# Patient Record
(demographics unavailable — no encounter records)

---

## 2024-10-25 NOTE — PHYSICAL EXAM
[General Appearance - Alert] : alert [General Appearance - In No Acute Distress] : in no acute distress [General Appearance - Well Nourished] : well nourished [General Appearance - Well-Appearing] : healthy appearing [Sclera] : the sclera and conjunctiva were normal [PERRL With Normal Accommodation] : pupils were equal in size, round, reactive to light [Extraocular Movements] : extraocular movements were intact [Outer Ear] : the ears and nose were normal in appearance [Hearing Threshold Finger Rub Not [place]] : hearing was normal [Examination Of The Oral Cavity] : the lips and gums were normal [Both Tympanic Membranes Were Examined] : both tympanic membranes were normal [Oropharynx] : the oropharynx was normal with no thrush [Neck Appearance] : the appearance of the neck was normal [Neck Cervical Mass (___cm)] : no neck mass was observed [Jugular Venous Distention Increased] : there was no jugular-venous distention [Thyroid Diffuse Enlargement] : the thyroid was not enlarged [Respiration, Rhythm And Depth] : normal respiratory rhythm and effort [Exaggerated Use Of Accessory Muscles For Inspiration] : no accessory muscle use [Auscultation Breath Sounds / Voice Sounds] : lungs were clear to auscultation bilaterally [Heart Rate And Rhythm] : heart rate was normal and rhythm regular [Heart Sounds] : normal S1 and S2 [Heart Sounds Gallop] : no gallops [Murmurs] : no murmurs [Heart Sounds Pericardial Friction Rub] : no pericardial rub [Edema] : there was no peripheral edema [Bowel Sounds] : normal bowel sounds [Abdomen Soft] : soft [Abdomen Tenderness] : non-tender [Costovertebral Angle Tenderness] : no CVA tenderness [No Palpable Adenopathy] : no palpable adenopathy [Musculoskeletal - Swelling] : no joint swelling [Nail Clubbing] : no clubbing  or cyanosis of the fingernails [Motor Tone] : muscle strength and tone were normal [Skin Color & Pigmentation] : normal skin color and pigmentation [] : no rash [Skin Lesions] : no skin lesions [Cranial Nerves] : cranial nerves 2-12 were intact [Sensation] : the sensory exam was normal to light touch and pinprick [Motor Exam] : the motor exam was normal [No Focal Deficits] : no focal deficits [Oriented To Time, Place, And Person] : oriented to person, place, and time [Affect] : the affect was normal [FreeTextEntry1] : very anxious during consult

## 2024-10-25 NOTE — HISTORY OF PRESENT ILLNESS
[FreeTextEntry1] : 25 yo male here for PEP/ PrEP initial consult  on 10/15/2024 pt noted a drop of blood on sink when he was washing his hands in the ER he was in the ER for chemical exposure at work  pt states he had a small break in skin on cuticle area  was afraid of elisa HIV  despite being told by ER provider the risk is extremely low for HIV transmission, pt opted to start PEP  was given Truvada but not Isentress.  He started the Truvada that day.  he called the ER due to the Truvada was exposed to extreme heat - asking for new bottle of Truvada  was told that he needed the Isentress with the Truvada for a complete PEP regimen He was started on Isentress 5 days after exposure  no SE with Truvada  noted with upset stomach and nausea Isentress for the past 3 days.   PMH : Fatty liver, asthma, anxiety, depression - under treatment, schizophrenia, tinnitus bilat, essential tremors, CTS bilat,  denies any recent hospitalizations, blood transfusions PCP : Dr. Phoebe Cooley.   Screenings : none  Vaccines : COVID Pfizer x2.  declines Influenza vaccine  PSH : appendectomy, PFH : Pat Aunts - Pulmonary CA,  Pat Aunts- cerebral aunts, MGF- Alzheimer's dx  Social hx : denies any IVDU, confirms marijuana use, former cigarette smoker- last 2016. last ETOH use 2023.  confirms tattoos  Sexual hx : Heterosexual. last sexual encounter 2019.  denies any condom use.  denies any hx of STI Partner : possible partner in another country- plans on visiting her 12/2024. 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   NKDA     10/24/2024 Plan  Exposure to blood Reviewed blood work results done in ER.  counselled patient that risk of HIV transmission from dried blood on sink in ER is negligible, amelia with intact skin. Counselled on Hepatitis C transmission- as can live on surfaces for weeks - however pt with intact skin during contact.  Pt was started on Truvada immediately but started Isentress 5 days after exposure.  Counselled OK to stop PEP medication as both medications needs to start withing 72 hours of exposure and risk is negligible.  Pt verbalized understanding.  1. stop PEP treatment  2. will repeat blood work in one month   PrEP  pt states intermittent blood exposure with work.  Counselled on PrEP commencement to prevent HIV transmission. Counselled on Truvada vs Descovy - dosing, risks, benefits, SE, long term effects, monitoring, scheduling.  Pt admitted will be in contact with girlfriend in [facility] 12/2024 and plans to be sexually active.  1. start Descovy one tab daily  2. completed STI testing 3. f/u one week via telemed for results discussion.    Anxiety/ depression.  pt noted with high level anxiety during consult.  Pt states has elevated glucose and Isentress caused the glucose to become more elevated, amelia also after eating.  Pt doing home fingersticks for glucose monitoring.  On review of labs in [facility], glucose levels are unremarkable and HgA1C is <5.0.  denies being dx with DM or taking any medications for DM.  Counselled patient that he does NOT have DM or even pre-DM.  Counselled it is a normal physiological reaction for glucose to become transiently elevated after meals, amelia a high sugar meal or drink.  The body will self-regulate- as indicated in blood work.  Explained regarding pts living with DM cannot self-regulate and need medication assistance.  That is not the case with patient.  Counselled to stop home glucose monitoring as is unnecessary.  Causing more mental stress that is not necessary.  Pt also scored depression screen- pt with hx of depression symptoms.  Pt under psychologist care and pending psychiatry consult for commencement of treatment.   1. f/u with psychologist as scheduled and consult with psychiatry for possible medication management.

## 2024-10-25 NOTE — ASSESSMENT
[FreeTextEntry1] : PEP/ PrEP initial consult   10/24/2024 Plan  Exposure to blood Reviewed blood work results done in ER.  counselled patient that risk of HIV transmission from dried blood on sink in ER is negligible, amelia with intact skin. Counselled on Hepatitis C transmission- as can live on surfaces for weeks - however pt with intact skin during contact.  Pt was started on Truvada immediately but started Isentress 5 days after exposure.  Counselled OK to stop PEP medication as both medications needs to start withing 72 hours of exposure and risk is negligible.  Pt verbalized understanding.  1. stop PEP treatment  2. will repeat blood work in one month   PrEP  pt states intermittent blood exposure with work.  Counselled on PrEP commencement to prevent HIV transmission. Counselled on Truvada vs Descovy - dosing, risks, benefits, SE, long term effects, monitoring, scheduling.  Pt admitted will be in contact with girlfriend in [facility] 12/2024 and plans to be sexually active.  1. start Descovy one tab daily  2. completed STI testing 3. f/u one week via telemed for results discussion.    Anxiety/ depression.  pt noted with high level anxiety during consult.  Pt states has elevated glucose and Isentress caused the glucose to become more elevated, amelia also after eating.  Pt doing home fingersticks for glucose monitoring.  On review of labs in [facility], glucose levels are unremarkable and HgA1C is <5.0.  denies being dx with DM or taking any medications for DM.  Counselled patient that he does NOT have DM or even pre-DM.  Counselled it is a normal physiological reaction for glucose to become transiently elevated after meals, amelia a high sugar meal or drink.  The body will self-regulate- as indicated in blood work.  Explained regarding pts living with DM cannot self-regulate and need medication assistance.  That is not the case with patient.  Counselled to stop home glucose monitoring as is unnecessary.  Causing more mental stress that is not necessary.  Pt also scored depression screen- pt with hx of depression symptoms.  Pt under psychologist care and pending psychiatry consult for commencement of treatment.   1. f/u with psychologist as scheduled and consult with psychiatry for possible medication management.       [Treatment Education] : treatment education [Treatment Adherence] : treatment adherence [Rx Dose / Side Effects] : Rx dose/side effects [Medical Care Issues] : medical care issues [Drug Interactions / Side Effects] : drug interactions/side effects [HIV Education] : HIV Education [Sexuality / Safer Sex] : sexuality/safer sex [Education Materials Provided] : Eduction materials were provided

## 2024-10-25 NOTE — HISTORY OF PRESENT ILLNESS
[FreeTextEntry1] : 25 yo male here for PEP/ PrEP initial consult  on 10/15/2024 pt noted a drop of blood on sink when he was washing his hands in the ER he was in the ER for chemical exposure at work  pt states he had a small break in skin on cuticle area  was afraid of elisa HIV  despite being told by ER provider the risk is extremely low for HIV transmission, pt opted to start PEP  was given Truvada but not Isentress.  He started the Truvada that day.  he called the ER due to the Truvada was exposed to extreme heat - asking for new bottle of Truvada  was told that he needed the Isentress with the Truvada for a complete PEP regimen He was started on Isentress 5 days after exposure  no SE with Truvada  noted with upset stomach and nausea Isentress for the past 3 days.   PMH : Fatty liver, asthma, anxiety, depression - under treatment, schizophrenia, tinnitus bilat, essential tremors, CTS bilat,  denies any recent hospitalizations, blood transfusions PCP : Dr. Phoebe Cooley.   Screenings : none  Vaccines : COVID Pfizer x2.  declines Influenza vaccine  PSH : appendectomy, PFH : Pat Aunts - Pulmonary CA,  Pat Aunts- cerebral aunts, MGF- Alzheimer's dx  Social hx : denies any IVDU, confirms marijuana use, former cigarette smoker- last 2016. last ETOH use 2023.  confirms tattoos  Sexual hx : Heterosexual. last sexual encounter 2019.  denies any condom use.  denies any hx of STI Partner : possible partner in another country- plans on visiting her 12/2024. 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   NKDA     10/24/2024 Plan  Exposure to blood Reviewed blood work results done in ER.  counselled patient that risk of HIV transmission from dried blood on sink in ER is negligible, amelia with intact skin. Counselled on Hepatitis C transmission- as can live on surfaces for weeks - however pt with intact skin during contact.  Pt was started on Truvada immediately but started Isentress 5 days after exposure.  Counselled OK to stop PEP medication as both medications needs to start withing 72 hours of exposure and risk is negligible.  Pt verbalized understanding.  1. stop PEP treatment  2. will repeat blood work in one month   PrEP  pt states intermittent blood exposure with work.  Counselled on PrEP commencement to prevent HIV transmission. Counselled on Truvada vs Descovy - dosing, risks, benefits, SE, long term effects, monitoring, scheduling.  Pt admitted will be in contact with girlfriend in [facility] 12/2024 and plans to be sexually active.  1. start Descovy one tab daily  2. completed STI testing 3. f/u one week via telemed for results discussion.    Anxiety/ depression.  pt noted with high level anxiety during consult.  Pt states has elevated glucose and Isentress caused the glucose to become more elevated, amelia also after eating.  Pt doing home fingersticks for glucose monitoring.  On review of labs in [facility], glucose levels are unremarkable and HgA1C is <5.0.  denies being dx with DM or taking any medications for DM.  Counselled patient that he does NOT have DM or even pre-DM.  Counselled it is a normal physiological reaction for glucose to become transiently elevated after meals, amelia a high sugar meal or drink.  The body will self-regulate- as indicated in blood work.  Explained regarding pts living with DM cannot self-regulate and need medication assistance.  That is not the case with patient.  Counselled to stop home glucose monitoring as is unnecessary.  Causing more mental stress that is not necessary.  Pt also scored depression screen- pt with hx of depression symptoms.  Pt under psychologist care and pending psychiatry consult for commencement of treatment.   1. f/u with psychologist as scheduled and consult with psychiatry for possible medication management.

## 2024-10-25 NOTE — PHYSICAL EXAM
[General Appearance - Alert] : alert [General Appearance - In No Acute Distress] : in no acute distress [General Appearance - Well Nourished] : well nourished [General Appearance - Well-Appearing] : healthy appearing [Sclera] : the sclera and conjunctiva were normal [PERRL With Normal Accommodation] : pupils were equal in size, round, reactive to light [Extraocular Movements] : extraocular movements were intact [Outer Ear] : the ears and nose were normal in appearance [Hearing Threshold Finger Rub Not [place]] : hearing was normal [Examination Of The Oral Cavity] : the lips and gums were normal [Both Tympanic Membranes Were Examined] : both tympanic membranes were normal [Oropharynx] : the oropharynx was normal with no thrush [Neck Appearance] : the appearance of the neck was normal [Neck Cervical Mass (___cm)] : no neck mass was observed [Jugular Venous Distention Increased] : there was no jugular-venous distention [Thyroid Diffuse Enlargement] : the thyroid was not enlarged [Respiration, Rhythm And Depth] : normal respiratory rhythm and effort [Exaggerated Use Of Accessory Muscles For Inspiration] : no accessory muscle use [Auscultation Breath Sounds / Voice Sounds] : lungs were clear to auscultation bilaterally [Heart Rate And Rhythm] : heart rate was normal and rhythm regular [Heart Sounds] : normal S1 and S2 [Heart Sounds Gallop] : no gallops [Murmurs] : no murmurs [Heart Sounds Pericardial Friction Rub] : no pericardial rub [Edema] : there was no peripheral edema [Bowel Sounds] : normal bowel sounds [Abdomen Soft] : soft [Abdomen Tenderness] : non-tender [Costovertebral Angle Tenderness] : no CVA tenderness [No Palpable Adenopathy] : no palpable adenopathy [Musculoskeletal - Swelling] : no joint swelling [Nail Clubbing] : no clubbing  or cyanosis of the fingernails [Motor Tone] : muscle strength and tone were normal [Skin Color & Pigmentation] : normal skin color and pigmentation [] : no rash [Skin Lesions] : no skin lesions [Cranial Nerves] : cranial nerves 2-12 were intact [Sensation] : the sensory exam was normal to light touch and pinprick [Motor Exam] : the motor exam was normal [No Focal Deficits] : no focal deficits [Oriented To Time, Place, And Person] : oriented to person, place, and time [Affect] : the affect was normal [FreeTextEntry1] : very anxious during consult

## 2024-10-25 NOTE — ASSESSMENT
[FreeTextEntry1] : PEP/ PrEP initial consult   10/24/2024 Plan  Exposure to blood Reviewed blood work results done in ER.  counselled patient that risk of HIV transmission from dried blood on sink in ER is negligible, amelia with intact skin. Counselled on Hepatitis C transmission- as can live on surfaces for weeks - however pt with intact skin during contact.  Pt was started on Truvada immediately but started Isentress 5 days after exposure.  Counselled OK to stop PEP medication as both medications needs to start withing 72 hours of exposure and risk is negligible.  Pt verbalized understanding.  1. stop PEP treatment  2. will repeat blood work in one month   PrEP  pt states intermittent blood exposure with work.  Counselled on PrEP commencement to prevent HIV transmission. Counselled on Truvada vs Descovy - dosing, risks, benefits, SE, long term effects, monitoring, scheduling.  Pt admitted will be in contact with girlfriend in [facility] 12/2024 and plans to be sexually active.  1. start Descovy one tab daily  2. completed STI testing 3. f/u one week via telemed for results discussion.    Anxiety/ depression.  pt noted with high level anxiety during consult.  Pt states has elevated glucose and Isentress caused the glucose to become more elevated, amelia also after eating.  Pt doing home fingersticks for glucose monitoring.  On review of labs in [facility], glucose levels are unremarkable and HgA1C is <5.0.  denies being dx with DM or taking any medications for DM.  Counselled patient that he does NOT have DM or even pre-DM.  Counselled it is a normal physiological reaction for glucose to become transiently elevated after meals, amelia a high sugar meal or drink.  The body will self-regulate- as indicated in blood work.  Explained regarding pts living with DM cannot self-regulate and need medication assistance.  That is not the case with patient.  Counselled to stop home glucose monitoring as is unnecessary.  Causing more mental stress that is not necessary.  Pt also scored depression screen- pt with hx of depression symptoms.  Pt under psychologist care and pending psychiatry consult for commencement of treatment.   1. f/u with psychologist as scheduled and consult with psychiatry for possible medication management.       [Treatment Education] : treatment education [Treatment Adherence] : treatment adherence [Rx Dose / Side Effects] : Rx dose/side effects [Medical Care Issues] : medical care issues [Drug Interactions / Side Effects] : drug interactions/side effects [HIV Education] : HIV Education [Sexuality / Safer Sex] : sexuality/safer sex [Education Materials Provided] : Eduction materials were provided

## 2024-11-27 NOTE — HISTORY OF PRESENT ILLNESS
[FreeTextEntry1] : 26 yo male here for PrEP f/u consult and results dicussion taking Descovy daily with no missed doses or SE did have nausea the first few weeks and then resolved.  denies any c/o at this time    From previous consult 10/24/2024 :  25 yo male here for PEP/ PrEP initial consult on 10/15/2024 pt noted a drop of blood on sink when he was washing his hands in the ER he was in the ER for chemical exposure at work pt states he had a small break in skin on cuticle area was afraid of elisa HIV despite being told by ER provider the risk is extremely low for HIV transmission, pt opted to start PEP was given Truvada but not Isentress. He started the Truvada that day. he called the ER due to the Truvada was exposed to extreme heat - asking for new bottle of Truvada was told that he needed the Isentress with the Truvada for a complete PEP regimen He was started on Isentress 5 days after exposure no SE with Truvada noted with upset stomach and nausea Isentress for the past 3 days.  PMH : Fatty liver, asthma, anxiety, depression - under treatment, schizophrenia, tinnitus bilat, essential tremors, CTS bilat, denies any recent hospitalizations, blood transfusions PCP : Dr. Phoebe Cooley. Screenings : none Vaccines : COVID Pfizer x2. declines Influenza vaccine PSH : appendectomy, PFH : Pat Aunts - Pulmonary CA, Pat Aunts- cerebral aunts, MGF- Alzheimer's dx Social hx : denies any IVDU, confirms marijuana use, former cigarette smoker- last 2016. last ETOH use 2023. confirms tattoos Sexual hx : Heterosexual. last sexual encounter 2019. denies any condom use. denies any hx of STI Partner : possible partner in another country- plans on visiting her 12/2024. 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  NKDA    10/24/2024 Plan PrEP all test results from previous consult reviewed with patient.   1. continue current treatment  2. repeat HIV testing as per pt request 3. f/u 3 months

## 2024-11-27 NOTE — HISTORY OF PRESENT ILLNESS
[FreeTextEntry1] : 24 yo male here for PrEP f/u consult and results dicussion taking Descovy daily with no missed doses or SE did have nausea the first few weeks and then resolved.  denies any c/o at this time    From previous consult 10/24/2024 :  23 yo male here for PEP/ PrEP initial consult on 10/15/2024 pt noted a drop of blood on sink when he was washing his hands in the ER he was in the ER for chemical exposure at work pt states he had a small break in skin on cuticle area was afraid of elisa HIV despite being told by ER provider the risk is extremely low for HIV transmission, pt opted to start PEP was given Truvada but not Isentress. He started the Truvada that day. he called the ER due to the Truvada was exposed to extreme heat - asking for new bottle of Truvada was told that he needed the Isentress with the Truvada for a complete PEP regimen He was started on Isentress 5 days after exposure no SE with Truvada noted with upset stomach and nausea Isentress for the past 3 days.  PMH : Fatty liver, asthma, anxiety, depression - under treatment, schizophrenia, tinnitus bilat, essential tremors, CTS bilat, denies any recent hospitalizations, blood transfusions PCP : Dr. Phoebe Cooley. Screenings : none Vaccines : COVID Pfizer x2. declines Influenza vaccine PSH : appendectomy, PFH : Pat Aunts - Pulmonary CA, Pat Aunts- cerebral aunts, MGF- Alzheimer's dx Social hx : denies any IVDU, confirms marijuana use, former cigarette smoker- last 2016. last ETOH use 2023. confirms tattoos Sexual hx : Heterosexual. last sexual encounter 2019. denies any condom use. denies any hx of STI Partner : possible partner in another country- plans on visiting her 12/2024. 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  NKDA    10/24/2024 Plan PrEP all test results from previous consult reviewed with patient.   1. continue current treatment  2. repeat HIV testing as per pt request 3. f/u 3 months

## 2024-11-27 NOTE — PHYSICAL EXAM
[General Appearance - Alert] : alert [General Appearance - In No Acute Distress] : in no acute distress [General Appearance - Well Nourished] : well nourished [General Appearance - Well-Appearing] : healthy appearing [Sclera] : the sclera and conjunctiva were normal [PERRL With Normal Accommodation] : pupils were equal in size, round, reactive to light [Extraocular Movements] : extraocular movements were intact [Outer Ear] : the ears and nose were normal in appearance [Hearing Threshold Finger Rub Not [place]] : hearing was normal [Examination Of The Oral Cavity] : the lips and gums were normal [Both Tympanic Membranes Were Examined] : both tympanic membranes were normal [Oropharynx] : the oropharynx was normal with no thrush [Neck Appearance] : the appearance of the neck was normal [Neck Cervical Mass (___cm)] : no neck mass was observed [Jugular Venous Distention Increased] : there was no jugular-venous distention [Thyroid Diffuse Enlargement] : the thyroid was not enlarged [Respiration, Rhythm And Depth] : normal respiratory rhythm and effort [Exaggerated Use Of Accessory Muscles For Inspiration] : no accessory muscle use [Auscultation Breath Sounds / Voice Sounds] : lungs were clear to auscultation bilaterally [Heart Rate And Rhythm] : heart rate was normal and rhythm regular [Heart Sounds] : normal S1 and S2 [Heart Sounds Gallop] : no gallops [Murmurs] : no murmurs [Heart Sounds Pericardial Friction Rub] : no pericardial rub [Edema] : there was no peripheral edema [Bowel Sounds] : normal bowel sounds [Abdomen Soft] : soft [Abdomen Tenderness] : non-tender [Costovertebral Angle Tenderness] : no CVA tenderness [No Palpable Adenopathy] : no palpable adenopathy [Musculoskeletal - Swelling] : no joint swelling [Nail Clubbing] : no clubbing  or cyanosis of the fingernails [Motor Tone] : muscle strength and tone were normal [Skin Color & Pigmentation] : normal skin color and pigmentation [] : no rash [Skin Lesions] : no skin lesions [Cranial Nerves] : cranial nerves 2-12 were intact [Sensation] : the sensory exam was normal to light touch and pinprick [Motor Exam] : the motor exam was normal [No Focal Deficits] : no focal deficits [Oriented To Time, Place, And Person] : oriented to person, place, and time [Affect] : the affect was normal [FreeTextEntry1] : anxious during consult

## 2024-11-27 NOTE — ASSESSMENT
[FreeTextEntry1] : PrEP f/u consult   10/24/2024 Plan PrEP all test results from previous consult reviewed with patient.   1. continue current treatment  2. repeat HIV testing as per pt request 3. f/u 3 months   [Treatment Education] : treatment education [Treatment Adherence] : treatment adherence

## 2024-11-27 NOTE — PHYSICAL EXAM
[General Appearance - Alert] : alert [General Appearance - In No Acute Distress] : in no acute distress [General Appearance - Well Nourished] : well nourished [General Appearance - Well-Appearing] : healthy appearing [Sclera] : the sclera and conjunctiva were normal [PERRL With Normal Accommodation] : pupils were equal in size, round, reactive to light [Extraocular Movements] : extraocular movements were intact [Outer Ear] : the ears and nose were normal in appearance [Hearing Threshold Finger Rub Not [place]] : hearing was normal [Examination Of The Oral Cavity] : the lips and gums were normal [Both Tympanic Membranes Were Examined] : both tympanic membranes were normal [Oropharynx] : the oropharynx was normal with no thrush [Neck Appearance] : the appearance of the neck was normal [Neck Cervical Mass (___cm)] : no neck mass was observed [Jugular Venous Distention Increased] : there was no jugular-venous distention [Thyroid Diffuse Enlargement] : the thyroid was not enlarged [Respiration, Rhythm And Depth] : normal respiratory rhythm and effort [Exaggerated Use Of Accessory Muscles For Inspiration] : no accessory muscle use [Auscultation Breath Sounds / Voice Sounds] : lungs were clear to auscultation bilaterally [Heart Rate And Rhythm] : heart rate was normal and rhythm regular [Heart Sounds] : normal S1 and S2 [Heart Sounds Gallop] : no gallops [Murmurs] : no murmurs [Heart Sounds Pericardial Friction Rub] : no pericardial rub [Edema] : there was no peripheral edema [Bowel Sounds] : normal bowel sounds [Abdomen Soft] : soft [Abdomen Tenderness] : non-tender [Costovertebral Angle Tenderness] : no CVA tenderness [No Palpable Adenopathy] : no palpable adenopathy [Musculoskeletal - Swelling] : no joint swelling [Nail Clubbing] : no clubbing  or cyanosis of the fingernails [Motor Tone] : muscle strength and tone were normal [Skin Color & Pigmentation] : normal skin color and pigmentation [] : no rash [Skin Lesions] : no skin lesions [Cranial Nerves] : cranial nerves 2-12 were intact [Sensation] : the sensory exam was normal to light touch and pinprick [Motor Exam] : the motor exam was normal [No Focal Deficits] : no focal deficits [Oriented To Time, Place, And Person] : oriented to person, place, and time [Affect] : the affect was normal [FreeTextEntry1] : anxious during consult

## 2025-02-27 NOTE — REVIEW OF SYSTEMS
[Negative] : Heme/Lymph [As Noted in HPI] : as noted in HPI [Skin Lesions] : skin lesion [Itching] : itching

## 2025-02-27 NOTE — HISTORY OF PRESENT ILLNESS
[FreeTextEntry1] : 26 yo male here for PrEP f/u consult  was taking Descovy daily but ran out 12/2024 pending commencement of Apretude.  would like to review SE and effectiveness of Apretude denies any c/o at this time   noted with rash on back for the past week confirms itching and scratching of area.   denies placing any OTC medications for this.    Sexual hx : pt denies any sexual activity since last consult.  Pending trip to his country to visit partner.      From previous consult 10/24/2024 ;  26 yo male here for PrEP f/u consult and results dicussion taking Descovy daily with no missed doses or SE did have nausea the first few weeks and then resolved. denies any c/o at this time   From previous consult 10/24/2024 : 25 yo male here for PEP/ PrEP initial consult on 10/15/2024 pt noted a drop of blood on sink when he was washing his hands in the ER he was in the ER for chemical exposure at work pt states he had a small break in skin on cuticle area was afraid of elisa HIV despite being told by ER provider the risk is extremely low for HIV transmission, pt opted to start PEP was given Truvada but not Isentress. He started the Truvada that day. he called the ER due to the Truvada was exposed to extreme heat - asking for new bottle of Truvada was told that he needed the Isentress with the Truvada for a complete PEP regimen He was started on Isentress 5 days after exposure no SE with Truvada noted with upset stomach and nausea Isentress for the past 3 days.  PMH : Fatty liver, asthma, anxiety, depression - under treatment, schizophrenia, tinnitus bilat, essential tremors, CTS bilat, denies any recent hospitalizations, blood transfusions PCP : Dr. Phoebe Cooley. Screenings : none Vaccines : COVID Pfizer x2. declines Influenza vaccine PSH : appendectomy, PFH : Pat Aunts - Pulmonary CA, Pat Aunts- cerebral aunts, MGF- Alzheimer's dx Social hx : denies any IVDU, confirms marijuana use, former cigarette smoker- last 2016. last ETOH use 2023. confirms tattoos Sexual hx : Heterosexual. last sexual encounter 2019. denies any condom use. denies any hx of STI Partner : possible partner in another country- plans on visiting her 12/2024. 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  NKDA    2/27/2025 Plan  PrEP  counselled on Apretude- dosing, risks, benefits, SE, long term effects, monitoring, scheduling.   1. continue Descovy for now - pending Apretude approval.  Pharmacy notified of pt's desire to commence Apretude 2. do blood work  3. f/u 3 months  Fungal rash  pt has 1.5 week hx of rash on back.  Denies placing any medications for this.  1. start Clotrimazole- Betamethasone cream BID

## 2025-02-27 NOTE — ASSESSMENT
[Treatment Education] : treatment education [Treatment Adherence] : treatment adherence [Rx Dose / Side Effects] : Rx dose/side effects [Medical Care Issues] : medical care issues [FreeTextEntry1] : PrEP f/u consult   2/27/2025 Plan  PrEP  counselled on Apretude- dosing, risks, benefits, SE, long term effects, monitoring, scheduling.  1. continue Descovy for now - pending Apretude approval.  Pharmacy notified of pt's desire to commence Apretude 2. do blood work  3. f/u 3 months  Fungal rash  pt has 1.5 week hx of rash on back.  Denies placing any medications for this. noted with plum sized, oval, erythematous with excoriation rash on central back 1. start Clotrimazole- Betamethasone cream BID

## 2025-02-27 NOTE — PHYSICAL EXAM
[General Appearance - Alert] : alert [General Appearance - In No Acute Distress] : in no acute distress [General Appearance - Well Nourished] : well nourished [General Appearance - Well-Appearing] : healthy appearing [Sclera] : the sclera and conjunctiva were normal [PERRL With Normal Accommodation] : pupils were equal in size, round, reactive to light [Extraocular Movements] : extraocular movements were intact [Outer Ear] : the ears and nose were normal in appearance [Hearing Threshold Finger Rub Not [place]] : hearing was normal [Examination Of The Oral Cavity] : the lips and gums were normal [Both Tympanic Membranes Were Examined] : both tympanic membranes were normal [Oropharynx] : the oropharynx was normal with no thrush [Neck Appearance] : the appearance of the neck was normal [Neck Cervical Mass (___cm)] : no neck mass was observed [Jugular Venous Distention Increased] : there was no jugular-venous distention [Thyroid Diffuse Enlargement] : the thyroid was not enlarged [Respiration, Rhythm And Depth] : normal respiratory rhythm and effort [Exaggerated Use Of Accessory Muscles For Inspiration] : no accessory muscle use [Auscultation Breath Sounds / Voice Sounds] : lungs were clear to auscultation bilaterally [Heart Rate And Rhythm] : heart rate was normal and rhythm regular [Heart Sounds] : normal S1 and S2 [Heart Sounds Gallop] : no gallops [Murmurs] : no murmurs [Heart Sounds Pericardial Friction Rub] : no pericardial rub [Bowel Sounds] : normal bowel sounds [Edema] : there was no peripheral edema [Abdomen Soft] : soft [Abdomen Tenderness] : non-tender [] : no hepato-splenomegaly [Costovertebral Angle Tenderness] : no CVA tenderness [No Palpable Adenopathy] : no palpable adenopathy [Musculoskeletal - Swelling] : no joint swelling [Range of Motion to Joints] : range of motion to joints [Nail Clubbing] : no clubbing  or cyanosis of the fingernails [Motor Tone] : muscle strength and tone were normal [Skin Color & Pigmentation] : normal skin color and pigmentation [Skin Lesions] : no skin lesions [Cranial Nerves] : cranial nerves 2-12 were intact [Sensation] : the sensory exam was normal to light touch and pinprick [Motor Exam] : the motor exam was normal [No Focal Deficits] : no focal deficits [Oriented To Time, Place, And Person] : oriented to person, place, and time [Affect] : the affect was normal [FreeTextEntry1] : anxious during consult